# Patient Record
Sex: FEMALE | Race: WHITE | NOT HISPANIC OR LATINO | URBAN - METROPOLITAN AREA
[De-identification: names, ages, dates, MRNs, and addresses within clinical notes are randomized per-mention and may not be internally consistent; named-entity substitution may affect disease eponyms.]

---

## 2022-06-01 ENCOUNTER — EMERGENCY (EMERGENCY)
Facility: HOSPITAL | Age: 22
LOS: 1 days | Discharge: HOME | End: 2022-06-01
Attending: EMERGENCY MEDICINE | Admitting: EMERGENCY MEDICINE
Payer: COMMERCIAL

## 2022-06-01 VITALS
HEART RATE: 106 BPM | RESPIRATION RATE: 16 BRPM | DIASTOLIC BLOOD PRESSURE: 72 MMHG | WEIGHT: 110.01 LBS | HEIGHT: 61 IN | TEMPERATURE: 98 F | OXYGEN SATURATION: 96 % | SYSTOLIC BLOOD PRESSURE: 144 MMHG

## 2022-06-01 DIAGNOSIS — Z91.018 ALLERGY TO OTHER FOODS: ICD-10-CM

## 2022-06-01 DIAGNOSIS — S61.211A LACERATION WITHOUT FOREIGN BODY OF LEFT INDEX FINGER WITHOUT DAMAGE TO NAIL, INITIAL ENCOUNTER: ICD-10-CM

## 2022-06-01 DIAGNOSIS — Z88.0 ALLERGY STATUS TO PENICILLIN: ICD-10-CM
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
## 2022-06-01 DIAGNOSIS — Z23 ENCOUNTER FOR IMMUNIZATION: ICD-10-CM
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
## 2022-06-01 PROCEDURE — 12001 RPR S/N/AX/GEN/TRNK 2.5CM/<: CPT

## 2022-06-01 PROCEDURE — 90715 TDAP VACCINE 7 YRS/> IM: CPT

## 2022-06-01 PROCEDURE — 73140 X-RAY EXAM OF FINGER(S): CPT | Mod: 26,LT

## 2022-06-01 PROCEDURE — 90471 IMMUNIZATION ADMIN: CPT

## 2022-06-01 PROCEDURE — 99283 EMERGENCY DEPT VISIT LOW MDM: CPT | Mod: 25

## 2022-06-01 PROCEDURE — 73140 X-RAY EXAM OF FINGER(S): CPT

## 2022-06-01 PROCEDURE — 73140 X-RAY EXAM OF FINGER(S): CPT | Mod: 26

## 2022-06-01 RX ORDER — TETANUS TOXOID, REDUCED DIPHTHERIA TOXOID AND ACELLULAR PERTUSSIS VACCINE, ADSORBED 5; 2.5; 8; 8; 2.5 [IU]/.5ML; [IU]/.5ML; UG/.5ML; UG/.5ML; UG/.5ML
0.5 SUSPENSION INTRAMUSCULAR ONCE
Refills: 0 | Status: COMPLETED | OUTPATIENT
Start: 2022-06-01 | End: 2022-06-01

## 2022-06-01 RX ORDER — IBUPROFEN 200 MG
600 TABLET ORAL ONCE
Refills: 0 | Status: COMPLETED | OUTPATIENT
Start: 2022-06-01 | End: 2022-06-01

## 2022-06-01 RX ADMIN — Medication 600 MILLIGRAM(S): at 14:31

## 2022-06-01 RX ADMIN — TETANUS TOXOID, REDUCED DIPHTHERIA TOXOID AND ACELLULAR PERTUSSIS VACCINE, ADSORBED 0.5 MILLILITER(S): 5; 2.5; 8; 8; 2.5 SUSPENSION INTRAMUSCULAR at 14:53

## 2022-06-01 NOTE — ED ADULT NURSE NOTE - OBJECTIVE STATEMENT
pt received aaox3 c/o lac on the left index finger. Pt cut finger with wood carving knife. Pt is unsure of last TDap vax. Pt denies any other medical complaints.

## 2022-06-01 NOTE — ED PROVIDER NOTE - PHYSICAL EXAMINATION
CONSTITUTIONAL: Awake, alert and in no apparent distress.  HEENT: Head is atraumatic. Eyes clear bilaterally, normal EOMI. Airway patent.  CARDIAC: Normal rate, regular rhythm.  Heart sounds S1, S2.   RESPIRATORY: Breath sounds clear and equal bilaterally. no tachypnea, respiratory distress.   GASTROINTESTINAL: Abdomen soft, non-tender, no guarding, distension.  MUSCULOSKELETAL: Spine appears normal, no midline spinal tenderness, range of motion is not limited, no muscle or joint tenderness. no bony tenderness. Finger laceration through lateral part of finger nail and finger, nail intact.   NEUROLOGICAL: Alert, no focal deficits, no motor or sensory deficits. no sensory deficit after flexion extension.   SKIN: Skin normal color for race, warm, dry and intact. No evidence of rash.  PSYCHIATRIC: Normal mood and affect. no apparent risk to self or others. CONSTITUTIONAL: Awake, alert and in no apparent distress.  MUSCULOSKELETAL: Finger laceration through lateral part of finger nail and finger, bleeding resolved. no sensory deficit on finger, active flexion extension.   NEUROLOGICAL: Alert, no focal deficits, no motor or sensory deficits.   SKIN: Skin normal color for race, warm, dry and intact. No evidence of rash.  PSYCHIATRIC: Normal mood and affect. no apparent risk to self or others.

## 2022-06-01 NOTE — ED PROVIDER NOTE - CLINICAL SUMMARY MEDICAL DECISION MAKING FREE TEXT BOX
Pt with L index finger laceration. Will obtain xray, laceration repair, and give ibuprofen. Pt with L index finger laceration. Will obtain xray, laceration repair, tetanus utd and give ibuprofen.

## 2022-06-01 NOTE — ED PROVIDER NOTE - PATIENT PORTAL LINK FT
You can access the FollowMyHealth Patient Portal offered by [facility] by registering at the following website: [email or web address]. By joining [employer]’s FollowMyHealth portal, you will also be able to view your health information using other applications (apps) compatible with our system.

## 2022-06-01 NOTE — ED PROVIDER NOTE - NSFOLLOWUPINSTRUCTIONS_ED_ALL_ED_FT
Laceration    A laceration is a cut that goes through all of the layers of the skin and into the tissue that is right under the skin. Some lacerations heal on their own. Others need to be closed with skin adhesive strips, skin glue, stitches (sutures), or staples. Proper laceration care minimizes the risk of infection and helps the laceration to heal better.  If non-absorbable stitches or staples have been placed, they must be taken out within the time frame instructed by your healthcare provider.    SEEK IMMEDIATE MEDICAL CARE IF YOU HAVE ANY OF THE FOLLOWING SYMPTOMS: swelling around the wound, worsening pain, drainage from the wound, red streaking going away from your wound, inability to move finger or toe near the laceration, or discoloration of skin near the laceration.      Tissue Adhesive Wound Care      Some cuts and wounds can be closed with skin glue (tissue adhesive). Skin glue holds the skin together and helps your wound heal faster. Skin glue goes away on its own as your wound gets better. It is important to take good care of your wound at home while it heals.      Follow these instructions at home:      Wound care                   •If a bandage (dressing) was put on the wound, keep it clean and dry.    •Follow instructions from your doctor about how often to change the bandage.  •Wash your hands for at least 20 seconds with soap and water before and after you change your bandage. If you cannot use soap and water, use hand .      •Change the bandage as often as told by your doctor.      •Leave skin glue in place. It will fall off on its own after 7–10 days.        • Do not scratch, rub, or pick at the skin glue.      • Do not put tape over the skin glue. The skin glue could come off when you take the tape off.      •Protect the wound from another injury.    •Check your wound area every day for signs of infection. Check for:  •More redness, swelling, or pain.      •Fluid or blood.      •Warmth.      •Pus or a bad smell.        Bathing   • Do not take baths, swim, or use a hot tub until your doctor approves. You may only be allowed to take sponge baths. Ask your doctor if you may take showers.  •Showers are usually allowed 24 hours after treatment.      •Cover the dressing with a watertight covering when you take a shower.        • Do not soak the area where skin glue has been used.      • Do not use soaps or creams on your wound.      Eating and drinking   •Eat healthy foods to help the wound heal. As told by your doctor, eat a diet that includes protein, vitamin A, vitamin C, and other nutrients. You should eat:  •Foods rich in protein. These include meat, fish, eggs, dairy, beans, and nuts.      •Foods rich in vitamin A. These include carrots and dark green, leafy vegetables.      •Foods rich in vitamin C. These include oranges, tomatoes, broccoli, and peppers.        •Drink enough fluid to keep your pee (urine) pale yellow.        General instructions    •Protect your wound from the sun when you are outside for the first 6 months, or for as long as told by your doctor. Put on sunscreen with an SPF of 30 or higher around the scar, or cover it up.      •Take over-the-counter and prescription medicines only as told by your doctor.      • Do not use any products that contain nicotine or tobacco, such as cigarettes, e-cigarettes, and chewing tobacco. These can delay wound healing. If you need help quitting, ask your doctor.      •Keep all follow-up visits as told by your doctor. This is important.        Contact a doctor if:    •The glue used on your wound gets soaked with blood or falls off before your wound has healed. The glue may need to be replaced.      •You have a fever or chills.        Get help right away if:    •Your wound breaks open.    •You have any of these signs of infection:  •More redness, swelling, or pain around your wound.      •A red streak at the area around your wound.      •Fluid or blood coming from your wound.      •Warmth coming from your wound.      •Pus or a bad smell coming from your wound.        •You get a rash after the glue is put on.        Summary    •Some cuts and wounds can be closed with skin glue (tissue adhesive). Skin glue holds the skin together and helps your wound heal faster.      •It is important to take good care of your wound at home while it heals.      •Wash your hands for at least 20 seconds with soap and water before and after you change your bandage.      •Eat healthy foods.      •Check your wound every day for signs of infection.      This information is not intended to replace advice given to you by your health care provider. Make sure you discuss any questions you have with your health care provider.

## 2022-06-01 NOTE — ED PROVIDER NOTE - OBJECTIVE STATEMENT
20 y/o F, healthy, presents to ED with L index pain earlier today. Pt states she cut herself with a wooden knife PTA and applied pressure to wound. She denies nausea, paresthesia and other injuries. 20 y/o F, healthy, presents to ED with L index pain earlier today. Pt states she cut herself with a wood knife PTA and applied pressure to wound. She denies numbness, paresthesia or other injuries. bleeding has now resolved.

## 2022-06-03 NOTE — ED POST DISCHARGE NOTE - ADDITIONAL DOCUMENTATION
Pt called stating she was told she would receive an abx rx after dc but no rx sent.  Pt reports finger is slightly red but has been so since initial eval. No fever/drainage.  Rx for doxy bid sent; wound precautions reviewed.

## 2024-02-27 ENCOUNTER — EMERGENCY (EMERGENCY)
Facility: HOSPITAL | Age: 24
LOS: 1 days | Discharge: HOME | End: 2024-02-27
Attending: EMERGENCY MEDICINE | Admitting: EMERGENCY MEDICINE
Payer: COMMERCIAL

## 2024-02-27 VITALS
DIASTOLIC BLOOD PRESSURE: 91 MMHG | HEIGHT: 71 IN | HEART RATE: 100 BPM | TEMPERATURE: 98 F | OXYGEN SATURATION: 100 % | WEIGHT: 119.93 LBS | SYSTOLIC BLOOD PRESSURE: 142 MMHG | RESPIRATION RATE: 18 BRPM

## 2024-02-27 VITALS
DIASTOLIC BLOOD PRESSURE: 72 MMHG | HEART RATE: 89 BPM | RESPIRATION RATE: 18 BRPM | SYSTOLIC BLOOD PRESSURE: 109 MMHG | OXYGEN SATURATION: 98 % | TEMPERATURE: 98 F

## 2024-02-27 DIAGNOSIS — R55 SYNCOPE AND COLLAPSE: ICD-10-CM

## 2024-02-27 DIAGNOSIS — F90.9 ATTENTION-DEFICIT HYPERACTIVITY DISORDER, UNSPECIFIED TYPE: ICD-10-CM

## 2024-02-27 DIAGNOSIS — F32.A DEPRESSION, UNSPECIFIED: ICD-10-CM

## 2024-02-27 DIAGNOSIS — Z91.018 ALLERGY TO OTHER FOODS: ICD-10-CM

## 2024-02-27 DIAGNOSIS — F43.9 REACTION TO SEVERE STRESS, UNSPECIFIED: ICD-10-CM

## 2024-02-27 DIAGNOSIS — Z88.0 ALLERGY STATUS TO PENICILLIN: ICD-10-CM

## 2024-02-27 PROBLEM — Z78.9 OTHER SPECIFIED HEALTH STATUS: Chronic | Status: ACTIVE | Noted: 2022-06-01

## 2024-02-27 PROCEDURE — 99282 EMERGENCY DEPT VISIT SF MDM: CPT

## 2024-02-27 PROCEDURE — 82962 GLUCOSE BLOOD TEST: CPT

## 2024-02-27 PROCEDURE — 99284 EMERGENCY DEPT VISIT MOD MDM: CPT

## 2024-02-27 NOTE — ED PROVIDER NOTE - NSFOLLOWUPINSTRUCTIONS_ED_ALL_ED_FT
Syncope    Syncope is when you temporarily lose consciousness, also called fainting or passing out. It is caused by a sudden decrease in blood flow to the brain. Even though most causes of syncope are not dangerous, syncope can possibly be a sign of a serious medical problem. Signs that you may be about to faint include feeling dizzy, lightheaded, nausea, visual changes, or cold/clammy skin. Do not drive, operate heavy machinery, or play sports until your health care provider says it is okay.    SEEK IMMEDIATE MEDICAL CARE IF YOU HAVE ANY OF THE FOLLOWING SYMPTOMS: severe headache, pain in your chest/abdomen/back, bleeding from your mouth or rectum, palpitations, shortness of breath, pain with breathing, seizure, confusion, or trouble walking.      FOLLOW UP WITH YOUR PSYCHIATRIST IN 1-2 DAYS AND PRIMARY CARE IN 1-2 WEEKS.

## 2024-02-27 NOTE — ED PROVIDER NOTE - MUSCULOSKELETAL, MLM
All bony prominences palpated and nontender.  Range of motion is not limited, no muscle or joint tenderness.  All soft tissue compartments palpated, normal, nontender without tension.  Bilateral radial pulses are normal, 2+ and symmetrical.  Bilateral Femoral/DP/PT pulses are normal 2+, and symmetrical.  No lower extremity edema or calf tenderness appreciated.  Negative Car's sign bilaterally.

## 2024-02-27 NOTE — ED PROVIDER NOTE - OBJECTIVE STATEMENT
22 yo F with hx of anxiety/depression and ADHD presenting with feeling lightheadedness and syncope while at work.  Felt anxious, flushed prior to syncope.  Reports sig stress.  No recent illness, le edema, calf pain, hx of dvt or pe.  No OCP use.

## 2024-02-27 NOTE — ED PROVIDER NOTE - PATIENT PORTAL LINK FT
You can access the FollowMyHealth Patient Portal offered by [facility] by registering at the following website: [email or web address]. By joining [employer]’s FollowMyHealth portal, you will also be able to view your health information using other applications (apps) compatible with our system.

## 2024-02-27 NOTE — ED ADULT NURSE NOTE - OBJECTIVE STATEMENT
23yF no pmhx presents to the ER s/p syncope. Pt states she woke up feeling "lightheaded and dizzy this morning". Denies room spinning "I just felt dizzy". She proceeded to attend work where she "saw stars and then woke up on the ground". PT states coworker is unsure of head strike, pt denies AC use. Pt endorses nausea. Denies vision changes, headache, sensation changes, SOB/CP, N/V/D, urinary symptoms.

## 2024-02-27 NOTE — ED ADULT NURSE NOTE - CHIEF COMPLAINT QUOTE
Pt presents to ED with complaints of syncopal episode. Pt unsure of headstrike. Pt not on thinners. Pt had near-syncopal event in triage. Upgraded to [place]. Pt endorsing chest pain, shortness of breath, and dizziness.

## 2024-02-27 NOTE — ED ADULT TRIAGE NOTE - CHIEF COMPLAINT QUOTE
Pt presents to ED with complaints of syncopal episode. Pt unsure of headstrike. Pt not on thinners. Pt endorsing chest pain, shortness of breath, and dizziness. Pt presents to ED with complaints of syncopal episode. Pt unsure of headstrike. Pt not on thinners. Pt had near-syncopal event in triage. Upgraded to [place]. Pt endorsing chest pain, shortness of breath, and dizziness. Pt presents to ED with complaints of syncopal episode at work. Pt unsure of headstrike. Pt not on thinners. Pt had near-syncopal event in triage. Upgraded to MD [place]. Pt endorsing chest pain, shortness of breath, and dizziness. Denies numbness/tingling, fevers/chills, slurred speech.

## 2024-02-27 NOTE — ED ADULT NURSE NOTE - NURSING MUSC ROM
Alprazolam      Last Written Prescription Date:  2/3/17  Last Fill Quantity: 30,   # refills: 0  Last Office Visit with [facility], [facility] or  Health prescribing provider: 12/14/16 Justo  Future Office visit:       Routing refill request to provider for review/approval because:  Drug not on the [facility], [facility] or  Health refill protocol or controlled substance     full range of motion in all extremities

## 2024-02-27 NOTE — ED PROVIDER NOTE - CLINICAL SUMMARY MEDICAL DECISION MAKING FREE TEXT BOX
Syncope in setting of stressors.  ? vasovagal.  Nl neuro, HDs.  EKG WNL.  No interval abn with psych meds.  Do not suspect PE, dissection, cardiac syncope, stroke.  Pt reassured.  Feels well.  Plan dc and close outpt fu. Bi-Rhombic Flap Text: The defect edges were debeveled with a #15 scalpel blade.  Given the location of the defect and the proximity to free margins a bi-rhombic flap was deemed most appropriate.  Using a sterile surgical marker, an appropriate rhombic flap was drawn incorporating the defect. The area thus outlined was incised deep to adipose tissue with a #15 scalpel blade.  The skin margins were undermined to an appropriate distance in all directions utilizing iris scissors.